# Patient Record
Sex: FEMALE | URBAN - NONMETROPOLITAN AREA
[De-identification: names, ages, dates, MRNs, and addresses within clinical notes are randomized per-mention and may not be internally consistent; named-entity substitution may affect disease eponyms.]

---

## 2020-12-04 ENCOUNTER — APPOINTMENT (RX ONLY)
Dept: URBAN - NONMETROPOLITAN AREA CLINIC 31 | Facility: CLINIC | Age: 40
Setting detail: DERMATOLOGY
End: 2020-12-04

## 2020-12-04 VITALS — TEMPERATURE: 96.6 F

## 2020-12-04 DIAGNOSIS — L81.1 CHLOASMA: ICD-10-CM

## 2020-12-04 PROCEDURE — ? IN-HOUSE DISPENSING PHARMACY

## 2020-12-04 PROCEDURE — 99201: CPT

## 2020-12-04 PROCEDURE — ? PATIENT SPECIFIC COUNSELING

## 2020-12-04 PROCEDURE — ? COUNSELING

## 2020-12-04 PROCEDURE — ? PRESCRIPTION

## 2020-12-04 RX ORDER — TRANEXAMIC ACID 650 MG/1
TABLET, FILM COATED ORAL
Qty: 30 | Refills: 2 | Status: ERX | COMMUNITY
Start: 2020-12-04

## 2020-12-04 RX ADMIN — TRANEXAMIC ACID: 650 TABLET, FILM COATED ORAL at 00:00

## 2020-12-04 NOTE — PROCEDURE: IN-HOUSE DISPENSING PHARMACY
Product 28 Units Dispensed: 0
Product 4 Unit Type: grams
Product 1 Refills: 3
Render Refills If Set To 0: Yes
Product 43 Amount/Unit (Numbers Only): 60
Product 13 Price/Unit (In Dollars): $45.00
Product 5 Amount/Unit (Numbers Only): 120
Product 20 Unit Type: mg
Product 44 Price/Unit (In Dollars): 90.00
Product 11 Price/Unit (In Dollars): 45.00
Product 1 Price/Unit (In Dollars): 35.00
Name Of Product 25: Fluocinonide Cream
Product 41 Refills: 1
Name Of Product 33: Seborrheic Dermatitis Shampoo **120 ml**
Name Of Product 26: Ketoconazole/ hydrocortisone cream
Product 9 Amount/Unit (Numbers Only): 30
Product 4 Application Directions: Apply small amount to entire face once daily. Moisturize, wear sunscreen
Product 26 Price/Unit (In Dollars): 40.00
Product 3 Application Directions: Apply topically to face at night
Product 9 Refills: 44
Product 36 Price/Unit (In Dollars): 50.00
Name Of Product 5: BP 8% Acne Wash
Product 42 Application Directions: Apply nightly
Product 29 Price/Unit (In Dollars): 65.00
Name Of Product 45: Skin brightening hydroquinone 8% combination sensitive skin
Name Of Product 42: Skin Brightening Hydroquinone 8% Combination
Product 45 Application Directions: Apply once to face nightly
Name Of Product 13: Rosacea Cream
Name Of Product 3: Adapalene 0.3% Gel
Product 71 Price/Unit (In Dollars): 45
Product 26 Refills: 2
Product 9 Application Directions: Apply topically to face qhs with moisturizer.
Name Of Product 36: Clobetasol solution
Name Of Product 41: AK Imiquimod Gel
Name Of Product 1: Sodium sulfacetamide 8%
Name Of Product 24: Fluocinolone Scalp and Body Oil
Name Of Product 2: Acne Triple Gel Combination
Name Of Product 32: Tacrolimus 0.1% Ointment
Name Of Product 43: Alopecia Topical Solution for Men
Name Of Product 71: Acne Rosacea Lotion Cleanser
Name Of Product 6: Clindamycin Gel
Product 12 Application Directions: Apply to affected area Qday
Product 2 Application Directions: Apply to face daily
Product 6 Application Directions: Apply BID to hands
Name Of Product 9: 0.1% Tretinoin Cream
Name Of Product 27: Hydrocortisone 2.5% / Tranilast 0.5% / Levo 2%
Name Of Product 4: BP 2.5% / Clindamycin Combination Gel
Name Of Product 12: Rosacea Triple Combination Gel
Name Of Product 7: Dapsone 6% Gel
Product 7 Price/Unit (In Dollars): 50
Name Of Product 21: Anti-Fungal Shampoo
Name Of Product 11: Metronidazole Gel
Name Of Product 8: Tretinoin 0.025% / Clindamycin Combination Cream
Name Of Product 23: Clobetasol Ointment
Name Of Product 28: Triamcinolone Cream
Name Of Product 31: Urea 40% Cream
Detail Level: Zone
Name Of Product 22: Clobetasol Cream
Product 44 Amount/Unit (Numbers Only): 240
Name Of Product 44: Anti-aging Body Tretinoin 0.05% Cream
Product 31 Application Directions: Apply to face once nightly at bedtime before retinol.
Name Of Product 10: Female Hormonal Acne Gel

## 2020-12-04 NOTE — PROCEDURE: PATIENT SPECIFIC COUNSELING
Detail Level: Detailed
Start hydroquinone with tretinoin cream once daily. Use for 12 weeks, then discontinue until follow up appt. \\nStart TXA twice daily. \\nDiscussed importance of daily sunscreen, minimum SPF30.

## 2020-12-04 NOTE — PROCEDURE: MIPS QUALITY
Verbal order per 
Quality 402: Tobacco Use And Help With Quitting Among Adolescents: Patient screened for tobacco and never smoked
Quality 265: Biopsy Follow-Up: Biopsy results reviewed, communicated, tracked, and documented
Quality 111:Pneumonia Vaccination Status For Older Adults: Pneumococcal Vaccination not Administered or Previously Received, Reason not Otherwise Specified
Quality 431: Preventive Care And Screening: Unhealthy Alcohol Use - Screening: Patient screened for unhealthy alcohol use using a single question and scores less than 2 times per year
Quality 110: Preventive Care And Screening: Influenza Immunization: Influenza Immunization Ordered or Recommended, but not Administered due to system reason
Detail Level: Generalized
Quality 130: Documentation Of Current Medications In The Medical Record: Current Medications Documented